# Patient Record
Sex: MALE | Race: OTHER | Employment: FULL TIME | URBAN - METROPOLITAN AREA
[De-identification: names, ages, dates, MRNs, and addresses within clinical notes are randomized per-mention and may not be internally consistent; named-entity substitution may affect disease eponyms.]

---

## 2019-09-10 ENCOUNTER — HOSPITAL ENCOUNTER (EMERGENCY)
Age: 39
Discharge: HOME | End: 2019-09-10
Attending: EMERGENCY MEDICINE
Payer: COMMERCIAL

## 2019-09-10 VITALS
OXYGEN SATURATION: 99 % | WEIGHT: 160 LBS | SYSTOLIC BLOOD PRESSURE: 138 MMHG | RESPIRATION RATE: 16 BRPM | TEMPERATURE: 98.1 F | DIASTOLIC BLOOD PRESSURE: 88 MMHG | HEART RATE: 75 BPM

## 2019-09-10 DIAGNOSIS — H11.421 CHEMOSIS OF CONJUNCTIVA SUBCONJUNCTIVAL EDEMA, RIGHT: Primary | ICD-10-CM

## 2019-09-10 PROCEDURE — 99283 EMERGENCY DEPT VISIT LOW MDM: CPT

## 2019-09-10 RX ORDER — ERYTHROMYCIN 5 MG/G
OINTMENT OPHTHALMIC
Qty: 1 G | Refills: 0 | Status: SHIPPED | OUTPATIENT
Start: 2019-09-10 | End: 2019-09-17

## 2019-09-10 RX ORDER — TETRACAINE HYDROCHLORIDE 5 MG/ML
1 SOLUTION OPHTHALMIC
Status: DISCONTINUED | OUTPATIENT
Start: 2019-09-10 | End: 2019-09-11 | Stop reason: HOSPADM

## 2019-09-11 NOTE — ED TRIAGE NOTES
Pt ambulatory to triage c/o irriitation to R eye x 3 days. Denies injury or drainage. Pain to R eye.

## 2019-09-11 NOTE — ED NOTES
Patient given discharge instructions per provider, patient verbalized understanding.  Ambulatory from ED

## 2019-09-11 NOTE — ED PROVIDER NOTES
Torrey Jiménez is a 35 y.o. male  who presents by private vehicle to ER with c/o Patient presents with:  Eye Pain  Patient presents with right eye x 3 days. Patient denies injury or drainage to the area. Patient denies any vision changes. He specifically denies any fevers, chills, nausea, vomiting, chest pain, shortness of breath, headache, rash, diarrhea, abdominal pain, urinary/bowel changes, sweating or weight loss. PCP: None   PMHx significant for: No past medical history on file. PSHx significant for: No past surgical history on file. Social Hx: Tobacco use: Social History    Tobacco Use      Smoking status: Not on file  ; EtOH use: The patient states he drinks 0 per week.; Illicit Drug use: Allergies:  No Known Allergies    There are no other complaints, changes or physical findings at this time. No past medical history on file. No past surgical history on file. No family history on file.     Social History     Socioeconomic History    Marital status: SINGLE     Spouse name: Not on file    Number of children: Not on file    Years of education: Not on file    Highest education level: Not on file   Occupational History    Not on file   Social Needs    Financial resource strain: Not on file    Food insecurity:     Worry: Not on file     Inability: Not on file    Transportation needs:     Medical: Not on file     Non-medical: Not on file   Tobacco Use    Smoking status: Not on file   Substance and Sexual Activity    Alcohol use: Not on file    Drug use: Not on file    Sexual activity: Not on file   Lifestyle    Physical activity:     Days per week: Not on file     Minutes per session: Not on file    Stress: Not on file   Relationships    Social connections:     Talks on phone: Not on file     Gets together: Not on file     Attends Yarsanism service: Not on file     Active member of club or organization: Not on file     Attends meetings of clubs or organizations: Not on file     Relationship status: Not on file    Intimate partner violence:     Fear of current or ex partner: Not on file     Emotionally abused: Not on file     Physically abused: Not on file     Forced sexual activity: Not on file   Other Topics Concern    Not on file   Social History Narrative    Not on file         ALLERGIES: Patient has no known allergies. Review of Systems   Constitutional: Negative for activity change, appetite change, chills and fever. HENT: Negative for congestion and sore throat. Eyes: Positive for pain and redness. Respiratory: Negative for cough and shortness of breath. Cardiovascular: Negative for chest pain. Gastrointestinal: Negative for abdominal pain, diarrhea, nausea and vomiting. Genitourinary: Negative for dysuria. Musculoskeletal: Negative for arthralgias and myalgias. Skin: Negative for color change. Neurological: Negative for dizziness. Psychiatric/Behavioral: The patient is not nervous/anxious. All other systems reviewed and are negative. Vitals:    09/10/19 2039   BP: 138/88   Pulse: 75   Resp: 16   Temp: 98.1 °F (36.7 °C)   SpO2: 99%   Weight: 72.6 kg (160 lb)            Physical Exam   Constitutional: He is oriented to person, place, and time. He appears well-developed and well-nourished. HENT:   Head: Normocephalic and atraumatic. Right Ear: External ear normal.   Left Ear: External ear normal.   Mouth/Throat: Oropharynx is clear and moist. No oropharyngeal exudate. Eyes: Pupils are equal, round, and reactive to light. EOM and lids are normal. Right eye exhibits chemosis. Right eye exhibits no discharge. Left eye exhibits no discharge. Right conjunctiva is injected. Right conjunctiva has no hemorrhage. No scleral icterus. Right eye exhibits normal extraocular motion and no nystagmus. Left eye exhibits normal extraocular motion and no nystagmus. Right pupil is round and reactive. Left pupil is round and reactive.  Pupils are equal. Slit lamp exam:       The right eye shows fluorescein uptake. The right eye shows no corneal abrasion and no anterior chamber bulge. Chemosis noted to right conjunctiva. No pain with EOM. No periorbital swelling or erythema. Neck: Normal range of motion. Neck supple. No tracheal deviation present. No thyromegaly present. Cardiovascular: Normal rate, regular rhythm, normal heart sounds and intact distal pulses. No murmur heard. Pulmonary/Chest: Effort normal and breath sounds normal. No respiratory distress. He has no wheezes. He has no rales. Abdominal: Soft. Bowel sounds are normal. He exhibits no distension. There is no tenderness. There is no rebound and no guarding. Musculoskeletal: Normal range of motion. He exhibits no edema or tenderness. Lymphadenopathy:     He has no cervical adenopathy. Neurological: He is alert and oriented to person, place, and time. No cranial nerve deficit. Coordination normal.   Skin: Skin is warm. No rash noted. No erythema. Psychiatric: He has a normal mood and affect. His behavior is normal. Judgment and thought content normal.   Nursing note and vitals reviewed. MDM  Number of Diagnoses or Management Options  Chemosis of conjunctiva subconjunctival edema, right:   Diagnosis management comments: Assesment/Plan- 35 y.o. Patient presents with:  Eye Pain  differential includes: conjunctivitis, chemosis, corneal abrasion. PE consistent with chemosis, will treat with erythromycin ointment. . Recommend ophthamology follow up. Patient educated on reasons to return to the ED. Procedures    I was personally available for consultation in the emergency department. I have reviewed the chart and agree with the documentation recorded by the [facility], including the assessment, treatment plan, and disposition.   Pao Bliss MD